# Patient Record
Sex: FEMALE | URBAN - METROPOLITAN AREA
[De-identification: names, ages, dates, MRNs, and addresses within clinical notes are randomized per-mention and may not be internally consistent; named-entity substitution may affect disease eponyms.]

---

## 2019-03-08 ENCOUNTER — EMERGENCY (EMERGENCY)
Facility: HOSPITAL | Age: 29
LOS: 1 days | Discharge: HOME | End: 2019-03-08
Attending: EMERGENCY MEDICINE | Admitting: EMERGENCY MEDICINE
Payer: OTHER MISCELLANEOUS

## 2019-03-08 VITALS
OXYGEN SATURATION: 100 % | RESPIRATION RATE: 16 BRPM | SYSTOLIC BLOOD PRESSURE: 109 MMHG | HEART RATE: 90 BPM | TEMPERATURE: 98 F | DIASTOLIC BLOOD PRESSURE: 77 MMHG

## 2019-03-08 DIAGNOSIS — M54.9 DORSALGIA, UNSPECIFIED: ICD-10-CM

## 2019-03-08 DIAGNOSIS — M25.511 PAIN IN RIGHT SHOULDER: ICD-10-CM

## 2019-03-08 PROCEDURE — 99283 EMERGENCY DEPT VISIT LOW MDM: CPT

## 2019-03-08 RX ORDER — IBUPROFEN 200 MG
1 TABLET ORAL
Qty: 21 | Refills: 0 | OUTPATIENT
Start: 2019-03-08 | End: 2019-03-14

## 2019-03-08 RX ORDER — ACETAMINOPHEN 500 MG
975 TABLET ORAL ONCE
Qty: 0 | Refills: 0 | Status: COMPLETED | OUTPATIENT
Start: 2019-03-08 | End: 2019-03-08

## 2019-03-08 RX ORDER — IBUPROFEN 200 MG
800 TABLET ORAL ONCE
Qty: 0 | Refills: 0 | Status: COMPLETED | OUTPATIENT
Start: 2019-03-08 | End: 2019-03-08

## 2019-03-08 RX ADMIN — Medication 800 MILLIGRAM(S): at 17:33

## 2019-03-08 RX ADMIN — Medication 975 MILLIGRAM(S): at 17:33

## 2019-03-08 NOTE — ED PROVIDER NOTE - CLINICAL SUMMARY MEDICAL DECISION MAKING FREE TEXT BOX
28 y.o F with no significant PMHx presents to the ED with c/o right sided back pain and right sided shoulder pain since today. Likely [facility]. Will give pain meds. Advised pt to stretch back in a couple of days. Will give work note.

## 2019-03-08 NOTE — ED PROVIDER NOTE - OBJECTIVE STATEMENT
28 y.o F with no significant PMHx presents to the ED with c/o right sided back pain and right sided shoulder pain since today. Pt states she works at the [employer]. While at work, she was attempting to open the window of the train when she felt sudden onset pain. Reports pain worse with twisting. Denies feeling pain with deep breath but reports feeling sore. Denies weakness, numbness, tingling, neck pain

## 2019-03-08 NOTE — ED PROVIDER NOTE - CARE PROVIDER_API CALL
Gurpreet Valentine (DO)  Medicine  Dept Director  [address] [place] [address], [place] [address]  Phone: [phone]  Fax: [phone]  Follow Up Time: